# Patient Record
Sex: MALE | Race: WHITE | Employment: FULL TIME | URBAN - METROPOLITAN AREA
[De-identification: names, ages, dates, MRNs, and addresses within clinical notes are randomized per-mention and may not be internally consistent; named-entity substitution may affect disease eponyms.]

---

## 2017-02-01 ENCOUNTER — HOSPITAL ENCOUNTER (OUTPATIENT)
Age: 27
Discharge: HOME | End: 2017-02-01
Attending: EMERGENCY MEDICINE
Payer: COMMERCIAL

## 2017-02-01 VITALS
RESPIRATION RATE: 20 BRPM | HEIGHT: 71 IN | OXYGEN SATURATION: 100 % | DIASTOLIC BLOOD PRESSURE: 68 MMHG | TEMPERATURE: 98 F | SYSTOLIC BLOOD PRESSURE: 133 MMHG | HEART RATE: 78 BPM | BODY MASS INDEX: 30.1 KG/M2 | WEIGHT: 215 LBS

## 2017-02-01 DIAGNOSIS — J40 BRONCHITIS: Primary | ICD-10-CM

## 2017-02-01 LAB — S PYO AG THROAT QL: NEGATIVE

## 2017-02-01 PROCEDURE — 99214 OFFICE O/P EST MOD 30 MIN: CPT

## 2017-02-01 PROCEDURE — 87430 STREP A AG IA: CPT

## 2017-02-01 PROCEDURE — 99213 OFFICE O/P EST LOW 20 MIN: CPT

## 2017-02-01 RX ORDER — BENZONATATE 100 MG/1
100 CAPSULE ORAL 3 TIMES DAILY PRN
Qty: 30 CAPSULE | Refills: 0 | Status: SHIPPED | OUTPATIENT
Start: 2017-02-01 | End: 2017-03-03

## 2017-02-01 RX ORDER — AMOXICILLIN 875 MG/1
875 TABLET, COATED ORAL 2 TIMES DAILY
Qty: 10 TABLET | Refills: 0 | Status: SHIPPED | OUTPATIENT
Start: 2017-02-01 | End: 2017-02-06

## 2017-02-01 RX ORDER — PREDNISONE 20 MG/1
40 TABLET ORAL DAILY
Qty: 10 TABLET | Refills: 0 | Status: SHIPPED | OUTPATIENT
Start: 2017-02-01 | End: 2017-02-06

## 2017-02-01 NOTE — ED PROVIDER NOTES
Patient Seen in: [facility]    History   Patient presents with:  Cough/URI  Sore Throat    Stated Complaint: COUGH/GREEN SPUTUM    HPI    Patient here with cough, congestion for 12 days.   No travel, no known sick contacts injected,  LUNGS: no accessory use, increased upper airway sounds,   CARDIO: RRR without murmur  EXTREMITIES: no cyanosis, clubbing or edema  GI: soft, non-tender, normal bowel sounds  SKIN: good skin turgor, no obvious rashes  Differential to include: URI

## 2017-02-01 NOTE — ED INITIAL ASSESSMENT (HPI)
Pt here with complaints of cough for the past 2 weeks and coughing up green sputum, pt also states throat is sore , pt denies any fevers

## 2024-06-29 ENCOUNTER — HOSPITAL ENCOUNTER (OUTPATIENT)
Age: 34
Discharge: HOME | End: 2024-06-29
Attending: EMERGENCY MEDICINE
Payer: COMMERCIAL

## 2024-06-29 VITALS
OXYGEN SATURATION: 99 % | HEART RATE: 98 BPM | SYSTOLIC BLOOD PRESSURE: 122 MMHG | TEMPERATURE: 97 F | RESPIRATION RATE: 16 BRPM | DIASTOLIC BLOOD PRESSURE: 82 MMHG

## 2024-06-29 DIAGNOSIS — T78.40XA ACUTE ALLERGIC REACTION, INITIAL ENCOUNTER: Primary | ICD-10-CM

## 2024-06-29 PROCEDURE — 99203 OFFICE O/P NEW LOW 30 MIN: CPT

## 2024-06-29 RX ORDER — BENZOCAINE/MENTHOL 6 MG-10 MG
1 LOZENGE MUCOUS MEMBRANE 2 TIMES DAILY
Qty: 14 G | Refills: 0 | Status: SHIPPED | OUTPATIENT
Start: 2024-06-29

## 2024-06-29 NOTE — ED PROVIDER NOTES
Patient Seen in: [facility] [place]      History     Chief Complaint   Patient presents with    Rash Skin Problem     Stated Complaint: bug bite    Subjective:   HPI    The patient is a 33-year-old male with no significant past medical history who presents now with rash on the extremities.  Patient states that the symptoms began on Friday and have persisted.  The patient states the lesions occasionally look swollen and puffy.  The patient denies any previous history of allergic reactions.  The patient denies any shortness of breath or throat swelling    Objective:   History reviewed. No pertinent past medical history.           History reviewed. No pertinent surgical history.             Social History     Socioeconomic History    Marital status: Single   Tobacco Use    Smoking status: Some Days    Smokeless tobacco: Current    Tobacco comments:     occ   Substance and Sexual Activity    Alcohol use: Yes     Comment: occ    Drug use: Never              Review of Systems    Positive for stated Chief Complaint: Rash Skin Problem    Other systems are as noted in HPI.  Constitutional and vital signs reviewed.      All other systems reviewed and negative except as noted above.    Physical Exam     ED Triage Vitals [06/29/24 1019]   /82   Pulse 98   Resp 16   Temp 97.4 °F (36.3 °C)   Temp src Temporal   SpO2 99 %   O2 Device None (Room air)       Current Vitals:   Vital Signs  BP: 122/82  Pulse: 98  Resp: 16  Temp: 97.4 °F (36.3 °C)  Temp src: Temporal    Oxygen Therapy  SpO2: 99 %  O2 Device: None (Room air)            Physical Exam    Constitutional: Well-developed well-nourished in no acute distress  Head: Normocephalic, no swelling or tenderness  Eyes: Nonicteric sclera, no conjunctival injection  ENT: TMs are clear and flat bilaterally.  There is no posterior pharyngeal erythema  Chest: Clear to auscultation, no tenderness  Cardiovascular: Regular rate and rhythm without murmur  Abdomen: Soft, nontender  and nondistended  Neurologic: Patient is awake, alert and oriented ×3.  The patient's motor strength is 5 out of 5 and symmetric in the upper and lower extremities bilaterally  Extremities: No focal swelling or tenderness  Skin: Few scattered maculopapular lesions on the forearms bilaterally without evidence of infection      ED Course   Labs Reviewed - No data to display          Topical steroids versus oral steroids were discussed with the patient.  He prefers topical steroids         MDM      Allergic reaction secondary to possible bug bites                                   Medical Decision Making      Disposition and Plan     Clinical Impression:  1. Acute allergic reaction, initial encounter         Disposition:  Discharge  6/29/2024 10:35 am    Follow-up:  Avinash Luo MD  [address]  [address]  [facility] [address]  [phone]    In 1 week            Medications Prescribed:  Discharge Medication List as of 6/29/2024 10:36 AM        START taking these medications    Details   hydrocortisone 1 % External Cream Apply 1 Application topically 2 (two) times daily., Normal, Disp-14 g, R-0

## 2024-06-29 NOTE — ED INITIAL ASSESSMENT (HPI)
This past Monday patient with itchy skin lesions to bilateral arms.  Applying hydrocortisone to the sites.

## 2024-07-26 ENCOUNTER — APPOINTMENT (OUTPATIENT)
Dept: LAB | Age: 34
End: 2024-07-26

## 2024-07-26 DIAGNOSIS — Z30.8 ENCOUNTER FOR OTHER CONTRACEPTIVE MANAGEMENT: Primary | ICD-10-CM

## 2024-07-26 LAB — SPERM P VAS #/AREA SMN HPF: NORMAL /HPF

## 2024-07-26 PROCEDURE — 89321 SEMEN ANAL SPERM DETECTION: CPT

## 2024-11-05 ENCOUNTER — HOSPITAL ENCOUNTER (OUTPATIENT)
Age: 34
Discharge: HOME | End: 2024-11-05
Payer: COMMERCIAL

## 2024-11-05 VITALS
RESPIRATION RATE: 16 BRPM | OXYGEN SATURATION: 98 % | TEMPERATURE: 98 F | HEART RATE: 62 BPM | DIASTOLIC BLOOD PRESSURE: 74 MMHG | SYSTOLIC BLOOD PRESSURE: 107 MMHG

## 2024-11-05 DIAGNOSIS — J02.9 ACUTE VIRAL PHARYNGITIS: Primary | ICD-10-CM

## 2024-11-05 LAB — S PYO AG THROAT QL IA.RAPID: NEGATIVE

## 2024-11-05 PROCEDURE — 99212 OFFICE O/P EST SF 10 MIN: CPT

## 2024-11-05 PROCEDURE — 99213 OFFICE O/P EST LOW 20 MIN: CPT

## 2024-11-05 PROCEDURE — 87651 STREP A DNA AMP PROBE: CPT | Performed by: PHYSICIAN ASSISTANT

## 2024-11-05 NOTE — ED PROVIDER NOTES
Chief Complaint   Patient presents with    Sore Throat       History obtained from: patient   services not used    HPI:     Chavez Mcclellan is a 34 year old male who presents with sore throat since yesterday. Patient also states he coughed up some phlegm this morning. Patient endorses pain with swallowing but continues to tolerate oral intake. Patient states his daughter was recently diagnosed with strep throat and his son was recently diagnosed with croup. Denies fever, chills, facial or neck swelling, drooling, voice change, headache, ear pain, chest pain, shortness of breath, wheezing, abdominal pain, nausea, vomiting, rash, neck stiffness.     PMH  No past medical history on file.    PFSH    PFS asessment screens reviewed and agree.  Nurses notes reviewed I agree with documentation.    No family history on file.  Family history reviewed with patient/caregiver and is not pertinent to presenting problem.  Social History     Socioeconomic History    Marital status: Single     Spouse name: Not on file    Number of children: Not on file    Years of education: Not on file    Highest education level: Not on file   Occupational History    Not on file   Tobacco Use    Smoking status: Some Days    Smokeless tobacco: Current    Tobacco comments:     occ   Substance and Sexual Activity    Alcohol use: Yes     Comment: occ    Drug use: Never    Sexual activity: Not on file   Other Topics Concern    Not on file   Social History Narrative    Not on file     Social Drivers of Health     Financial Resource Strain: Not on file   Food Insecurity: Not on file   Transportation Needs: Not on file   Physical Activity: Not on file   Stress: Not on file   Social Connections: Not on file   Housing Stability: Not on file         ROS:   Positive for stated complaint: sore throat, phlegm   All other systems reviewed and negative except as noted above.  Constitutional and Vital Signs Reviewed.    Physical Exam:     Findings:    BP  107/74   Pulse 62   Temp 97.6 °F (36.4 °C) (Temporal)   Resp 16   SpO2 98%   GENERAL: well developed, no acute distress, non-toxic appearing   SKIN: good skin turgor, no obvious rashes  HEAD: normocephalic, atraumatic  EYES: sclera non-icteric bilaterally, conjunctiva clear bilaterally  EARS: canals clear bilaterally, TMs clear bilaterally  NOSE: nasal turbinates pink, normal mucosa  OROPHARYNX: MMM, pharynx mildly erythematous, no exudates or swelling, uvula midline, no tongue elevation, maintaining airway and secretions  NECK: supple, no lymphadenopathy, no nuchal rigidity, no trismus, no edema, phonation normal    CARDIO: RRR, normal heart sounds   LUNGS: clear to auscultation bilaterally, no increased WOB, no rales, rhonchi, or wheezes  EXTREMITIES: no cyanosis or edema, BROWN without difficulty  NEURO: no focal deficits  PSYCH: alert and oriented x3, answering questions appropriately, mood appropriate    MDM/Assessment/Plan:   Orders for this encounter:    Orders Placed This Encounter    Rapid Strep A - ID NOW     Order Specific Question:   Release to patient     Answer:   Immediate       Labs performed this visit:  Recent Results (from the past 10 hours)   Rapid Strep A - ID NOW    Collection Time: 11/05/24 10:42 AM    Specimen: Throat; Other   Result Value Ref Range    Strep A by PCR Negative Negative       Imaging performed this visit:  No orders to display       Medical Decision Making  DDx includes strep pharyngitis versus viral pharyngitis versus viral URI versus other.  Patient is overall very well-appearing with stable vitals and tolerating oral intake.  No tonsillar swelling or signs of PTA.  Strep negative. Patient declines covid and flu test. Discussed supportive care for suspected viral illness including rest, increased fluid intake, and OTC Tylenol/Motrin as needed for pain or fevers.  Discussed infection control.  Instructed patient to go directly to nearest ER with any worsening or concerning  symptoms.  Follow-up with PCP.    Amount and/or Complexity of Data Reviewed  Labs: ordered.    Risk  OTC drugs.          Diagnosis:    ICD-10-CM    1. Acute viral pharyngitis  J02.9           All results reviewed and discussed with patient/patient's family. Patient/patient's family verbalize excellent understanding of instructions and feels comfortable with plan. All of patient's/patient's family's questions were addressed.   See AVS for detailed discharge instructions for your condition today.    Follow Up with:  Fartun Eason MD  [address] [place] [address]  [phone]    Schedule an appointment as soon as possible for a visit   New primary care provider      Note: This document was dictated using Dragon medical dictation software.  Proofreading was performed to the best of my ability, but errors may be present.    Caroline Kay PA-C

## 2024-11-05 NOTE — DISCHARGE INSTRUCTIONS
Alternate Tylenol/Motrin every 3 hours as needed for pain or fever > 100.4   Drink plenty of fluids including warm tea with honey/lemon   Get plenty of rest     You may benefit from using a humidifier  Avoid having air blow on your face    Wash hands often  Disinfect your environment  Do not share utensils or drinks    Follow up with your primary care provider    If you experience severe/worsening pain, difficulty swallowing or breathing, facial or neck swelling, drooling, change in voice, or any other concerning symptoms, go to nearest ER immediately

## (undated) NOTE — ED AVS SNAPSHOT
[facility] AND [facility] in [address] [address] [place]ru[place]s    Phone:  [phone]    Fax:  [phone]           Catrachita Wheat   MRN: [id number]    Department:  [facility] AND [facility] in [address] [place] [address]   Date of Visit:  2 Insurance plans vary and the physician(s) referred by the Immediate Care may not be covered by your plan. It is possible that the physician may not participate in your health insurance plan.   This may result in a lower benefit level being available to yo CARE PHYSICIAN AT ONCE OR GO TO THE EMERGENCY DEPARTMENT. If you have been prescribed any medication(s), please fill your prescription right away and begin taking the medication(s) as directed.   If you believe that any of the medications or instructions - If you don’t have insurance, Robi Almeida has partnered with [facility] to help you get signed up for insurance coverage.   [facility] is a [employer] program that can help with your Affordable Care Act cover

## (undated) NOTE — LETTER
Date & Time: 6/29/2024, 10:34 AM  Patient: Chavez Mcclellan  Encounter Provider(s):    Ricardo Marrero MD       To Whom It May Concern:    Chavez Mcclellan was seen and treated in our department on 6/29/2024. He  should be excused from work yesterday.  He may return to work on 7/1/2024 .    If you have any questions or concerns, please do not hesitate to call.        _____________________________  Physician/APC Signature